# Patient Record
Sex: MALE | Race: WHITE | Employment: STUDENT | URBAN - METROPOLITAN AREA
[De-identification: names, ages, dates, MRNs, and addresses within clinical notes are randomized per-mention and may not be internally consistent; named-entity substitution may affect disease eponyms.]

---
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
## 2019-01-07 ENCOUNTER — HOSPITAL ENCOUNTER (OUTPATIENT)
Age: 19
Discharge: HOME | End: 2019-01-07
Attending: FAMILY MEDICINE
Payer: COMMERCIAL

## 2019-01-07 VITALS
TEMPERATURE: 98 F | HEART RATE: 81 BPM | RESPIRATION RATE: 16 BRPM | SYSTOLIC BLOOD PRESSURE: 119 MMHG | OXYGEN SATURATION: 98 % | DIASTOLIC BLOOD PRESSURE: 71 MMHG

## 2019-01-07 DIAGNOSIS — Z48.02 ENCOUNTER FOR REMOVAL OF SUTURES: Primary | ICD-10-CM

## 2019-01-07 NOTE — ED PROVIDER NOTES
48-year-old male presents today for suture removal on his right hand over the knuckles that was placed redness ago. He denies any pain, no problems with holding, gripping, tingling, numbness or weakness.   On examination of the laceration wound at the four

## 2019-01-07 NOTE — ED INITIAL ASSESSMENT (HPI)
Pt seen 12/28 for stitches to right hand after he punched a picture frame. Presents with stitches intact, no redness or evidence of infection.

## 2019-02-12 ENCOUNTER — OFFICE VISIT (OUTPATIENT)
Dept: FAMILY MEDICINE CLINIC | Facility: CLINIC | Age: 19
End: 2019-02-12
Payer: COMMERCIAL

## 2019-02-12 VITALS
RESPIRATION RATE: 20 BRPM | SYSTOLIC BLOOD PRESSURE: 120 MMHG | HEIGHT: 70 IN | HEART RATE: 76 BPM | DIASTOLIC BLOOD PRESSURE: 86 MMHG | BODY MASS INDEX: 18.13 KG/M2 | TEMPERATURE: 98 F | WEIGHT: 126.63 LBS

## 2019-02-12 DIAGNOSIS — R19.7 DIARRHEA, UNSPECIFIED TYPE: ICD-10-CM

## 2019-02-12 DIAGNOSIS — R17 ELEVATED BILIRUBIN: ICD-10-CM

## 2019-02-12 DIAGNOSIS — R10.84 GENERALIZED ABDOMINAL PAIN: ICD-10-CM

## 2019-02-12 DIAGNOSIS — R63.4 WEIGHT LOSS: Primary | ICD-10-CM

## 2019-02-12 LAB
ALBUMIN SERPL-MCNC: 4.8 G/DL (ref 3.4–5)
ALBUMIN/GLOB SERPL: 1.2 {RATIO} (ref 1–2)
ALP LIVER SERPL-CCNC: 94 U/L (ref 45–117)
ALT SERPL-CCNC: 11 U/L (ref 16–61)
ANION GAP SERPL CALC-SCNC: 9 MMOL/L (ref 0–18)
AST SERPL-CCNC: 16 U/L (ref 15–37)
BASOPHILS # BLD AUTO: 0.03 X10(3) UL (ref 0–0.2)
BASOPHILS NFR BLD AUTO: 0.5 %
BILIRUB SERPL-MCNC: 2.9 MG/DL (ref 0.1–2)
BUN BLD-MCNC: 9 MG/DL (ref 7–18)
BUN/CREAT SERPL: 9.3 (ref 10–20)
CALCIUM BLD-MCNC: 9.2 MG/DL (ref 8.5–10.1)
CHLORIDE SERPL-SCNC: 105 MMOL/L (ref 98–107)
CO2 SERPL-SCNC: 27 MMOL/L (ref 21–32)
CREAT BLD-MCNC: 0.97 MG/DL (ref 0.7–1.3)
DEPRECATED RDW RBC AUTO: 37.1 FL (ref 35.1–46.3)
EOSINOPHIL # BLD AUTO: 0.11 X10(3) UL (ref 0–0.7)
EOSINOPHIL NFR BLD AUTO: 1.8 %
ERYTHROCYTE [DISTWIDTH] IN BLOOD BY AUTOMATED COUNT: 12.7 % (ref 11–15)
GLOBULIN PLAS-MCNC: 3.9 G/DL (ref 2.8–4.4)
GLUCOSE BLD-MCNC: 108 MG/DL (ref 70–99)
HCT VFR BLD AUTO: 45.3 % (ref 39–53)
HGB BLD-MCNC: 15 G/DL (ref 13–17.5)
IMM GRANULOCYTES # BLD AUTO: 0.02 X10(3) UL (ref 0–1)
IMM GRANULOCYTES NFR BLD: 0.3 %
LYMPHOCYTES # BLD AUTO: 2.41 X10(3) UL (ref 1.5–5)
LYMPHOCYTES NFR BLD AUTO: 40.1 %
M PROTEIN MFR SERPL ELPH: 8.7 G/DL (ref 6.4–8.2)
MCH RBC QN AUTO: 26.9 PG (ref 26–34)
MCHC RBC AUTO-ENTMCNC: 33.1 G/DL (ref 31–37)
MCV RBC AUTO: 81.3 FL (ref 80–100)
MONOCYTES # BLD AUTO: 0.6 X10(3) UL (ref 0.1–1)
MONOCYTES NFR BLD AUTO: 10 %
NEUTROPHILS # BLD AUTO: 2.84 X10 (3) UL (ref 1.5–7.7)
NEUTROPHILS # BLD AUTO: 2.84 X10(3) UL (ref 1.5–7.7)
NEUTROPHILS NFR BLD AUTO: 47.3 %
OSMOLALITY SERPL CALC.SUM OF ELEC: 291 MOSM/KG (ref 275–295)
PLATELET # BLD AUTO: 226 10(3)UL (ref 150–450)
POTASSIUM SERPL-SCNC: 3.8 MMOL/L (ref 3.5–5.1)
RBC # BLD AUTO: 5.57 X10(6)UL (ref 4.3–5.7)
SODIUM SERPL-SCNC: 141 MMOL/L (ref 136–145)
T4 FREE SERPL-MCNC: 1.4 NG/DL (ref 0.9–1.6)
TSI SER-ACNC: 1.64 MIU/ML (ref 0.36–3.74)
WBC # BLD AUTO: 6 X10(3) UL (ref 4–11)

## 2019-02-12 PROCEDURE — 99214 OFFICE O/P EST MOD 30 MIN: CPT | Performed by: FAMILY MEDICINE

## 2019-02-12 PROCEDURE — 80050 GENERAL HEALTH PANEL: CPT | Performed by: FAMILY MEDICINE

## 2019-02-12 PROCEDURE — 82248 BILIRUBIN DIRECT: CPT | Performed by: FAMILY MEDICINE

## 2019-02-12 PROCEDURE — 36415 COLL VENOUS BLD VENIPUNCTURE: CPT | Performed by: FAMILY MEDICINE

## 2019-02-12 PROCEDURE — 84439 ASSAY OF FREE THYROXINE: CPT | Performed by: FAMILY MEDICINE

## 2019-02-12 NOTE — PROGRESS NOTES
Ayah Shelby is a 22year old male. HPI:   Barbra Foss is here for evaluation of stomach pain?  He states when he gets up he feels fine, but as the morning progresses he gets this sensation and then has to have a BM and may have 1-3 watery or loose stools, pain    Orders Placed This Encounter      CBC W Differential W Platelet [E]      Comp Metabolic Panel (14) [E]      TSH and Free T4 [E]      Meds & Refills for this Visit:  Requested Prescriptions      No prescriptions requested or ordered in this encounte

## 2019-02-12 NOTE — PROGRESS NOTES
Stomach issues only in am and will improve throughout the day. Not really in pain, just an odd sensation. Sometimes resolves after BM. Sometimes will have 3 BMs within an hour and a half. Trouble eating, sometimes will feel has to vomit.   Loss of appeti

## 2019-02-13 ENCOUNTER — TELEPHONE (OUTPATIENT)
Dept: FAMILY MEDICINE CLINIC | Facility: CLINIC | Age: 19
End: 2019-02-13

## 2019-02-13 DIAGNOSIS — R17 ELEVATED BILIRUBIN: Primary | ICD-10-CM

## 2019-02-13 LAB
BILIRUB DIRECT SERPL-MCNC: 0.2 MG/DL (ref 0–0.2)
BILIRUB SERPL-MCNC: 2.9 MG/DL (ref 0.1–2)

## 2019-02-13 NOTE — TELEPHONE ENCOUNTER
----- Message from Zofia Mitchell, DO sent at 2/13/2019 12:19 PM CST -----  Can we do total and direct bilirubin on this?

## 2019-02-15 ENCOUNTER — TELEPHONE (OUTPATIENT)
Dept: FAMILY MEDICINE CLINIC | Facility: CLINIC | Age: 19
End: 2019-02-15

## 2019-02-15 DIAGNOSIS — R11.0 NAUSEA: ICD-10-CM

## 2019-02-15 DIAGNOSIS — R10.11 ABDOMINAL PAIN, RIGHT UPPER QUADRANT: Primary | ICD-10-CM

## 2019-02-15 DIAGNOSIS — R63.4 WEIGHT LOSS: ICD-10-CM

## 2019-02-15 NOTE — TELEPHONE ENCOUNTER
----- Message from Heather Haney DO sent at 2/15/2019 11:38 AM CST -----  Can notify Elida Stover that his labs look pretty good overall, I didn't find anything specifically that would be causing his weight loss, but I would like to get an US of the abdomen to l

## 2019-02-15 NOTE — TELEPHONE ENCOUNTER
Verbal from Dr David Howard to call lab to see if celiac panel can be added. Spoke to Lali at the lab. Need gold tube. Unable to add lab. DS verbally notified.

## 2019-02-27 ENCOUNTER — OFFICE VISIT (OUTPATIENT)
Dept: FAMILY MEDICINE CLINIC | Facility: CLINIC | Age: 19
End: 2019-02-27
Payer: COMMERCIAL

## 2019-02-27 VITALS
WEIGHT: 126.81 LBS | DIASTOLIC BLOOD PRESSURE: 80 MMHG | TEMPERATURE: 99 F | RESPIRATION RATE: 18 BRPM | BODY MASS INDEX: 18 KG/M2 | HEART RATE: 106 BPM | SYSTOLIC BLOOD PRESSURE: 120 MMHG | OXYGEN SATURATION: 99 %

## 2019-02-27 DIAGNOSIS — R50.9 FEVER, UNSPECIFIED FEVER CAUSE: ICD-10-CM

## 2019-02-27 DIAGNOSIS — J10.1 INFLUENZA A: Primary | ICD-10-CM

## 2019-02-27 DIAGNOSIS — M79.10 MYALGIA: ICD-10-CM

## 2019-02-27 PROCEDURE — 99214 OFFICE O/P EST MOD 30 MIN: CPT | Performed by: FAMILY MEDICINE

## 2019-02-27 RX ORDER — OSELTAMIVIR PHOSPHATE 75 MG/1
75 CAPSULE ORAL 2 TIMES DAILY
Qty: 10 CAPSULE | Refills: 0 | Status: SHIPPED | OUTPATIENT
Start: 2019-02-27 | End: 2020-12-02 | Stop reason: ALTCHOICE

## 2019-02-27 NOTE — PROGRESS NOTES
HPI:   Toni San is a 22year old male who presents for upper respiratory symptoms for  2  days. Patient reports sore throat, congestion, fever with Tmax to 102, dry cough. Has had a dry cough and nausea no emesis, had some diarrhea.  Today, denies a & Refills for this Visit:  Requested Prescriptions     Signed Prescriptions Disp Refills   • Oseltamivir Phosphate 75 MG Oral Cap 10 capsule 0     Sig: Take 1 capsule (75 mg total) by mouth 2 (two) times daily.        Imaging & Consults:  None

## 2019-03-18 ENCOUNTER — PATIENT OUTREACH (OUTPATIENT)
Dept: FAMILY MEDICINE CLINIC | Facility: CLINIC | Age: 19
End: 2019-03-18

## 2020-10-14 ENCOUNTER — TELEPHONE (OUTPATIENT)
Dept: FAMILY MEDICINE CLINIC | Facility: CLINIC | Age: 20
End: 2020-10-14

## 2020-10-14 NOTE — TELEPHONE ENCOUNTER
Mom called, pt called her saying his heart felt funny and he wanted to see a dr.  Please call pt at [phone]

## 2020-12-02 NOTE — PROGRESS NOTES
Conception Stalker is a 23year old male. HPI:   Livia Knight was in the ER at [facility] for chest pain, 2 days ago, with chest pain, and heart racing, he does not drink ETOH he is not drinking enough water, he is not drinking any caffeine.  He notes that his Sx were clearly, has some insight into his issues, agreeable  ASSESSMENT AND PLAN:     Anxiety  (primary encounter diagnosis)  Palpitations  Dysthymia  Ptsd (post-traumatic stress disorder)  Suicidal ideation    HAS HX OF LSD USE IN THE PAST, ALSO HAS HX OF PTSD,

## 2021-10-15 ENCOUNTER — TELEPHONE (OUTPATIENT)
Dept: FAMILY MEDICINE CLINIC | Facility: CLINIC | Age: 21
End: 2021-10-15

## 2021-10-15 NOTE — TELEPHONE ENCOUNTER
Future Appointments   Date Time Provider Esa Divina   10/19/2021  3:00 PM [facility] NURSE YAMILKA [facility]     Pt coming on Tuesday for tetanus shot    Please place order, thank you.

## 2021-12-08 ENCOUNTER — TELEPHONE (OUTPATIENT)
Dept: FAMILY MEDICINE CLINIC | Facility: CLINIC | Age: 21
End: 2021-12-08

## 2021-12-08 NOTE — TELEPHONE ENCOUNTER
Mom and dad both tested COVID positive today with home tests    Pt refuses to take a test, but has all the sxs mom and dad do- cough, fever and congestion    Mom is wondering if we can place order for PAB incase they need it later this week?

## 2022-04-11 ENCOUNTER — TELEPHONE (OUTPATIENT)
Dept: FAMILY MEDICINE CLINIC | Facility: CLINIC | Age: 22
End: 2022-04-11

## 2022-04-11 NOTE — TELEPHONE ENCOUNTER
Unable to leave message because VM is full    If cut is bad enough - pt should have it looked at TDAP placed? Pt has order in chart from October 2021 for TDAP- looks like he was going to come in and have one done in October?

## 2022-04-11 NOTE — TELEPHONE ENCOUNTER
RN spoke to pt- he states he cut his hand on the tailgate of his fathers truck    He states the cut is not big enough to require stitches    He is current out of state in [place]    Dr. Bradford Hanna advised he can go to [facility] to have them do a tetanus shot    Pt verbalized understandng

## 2022-04-11 NOTE — TELEPHONE ENCOUNTER
patient cut his finger with some rust had a tdap in 2013 should he get another one now, he is out of town and will be back on wedneday can come in on Thursday

## 2022-10-27 ENCOUNTER — TELEPHONE (OUTPATIENT)
Dept: FAMILY MEDICINE CLINIC | Facility: CLINIC | Age: 22
End: 2022-10-27

## 2022-10-27 ENCOUNTER — NURSE ONLY (OUTPATIENT)
Dept: FAMILY MEDICINE CLINIC | Facility: CLINIC | Age: 22
End: 2022-10-27
Payer: MEDICAID

## 2022-10-27 PROCEDURE — 90471 IMMUNIZATION ADMIN: CPT | Performed by: FAMILY MEDICINE

## 2022-10-27 PROCEDURE — 90715 TDAP VACCINE 7 YRS/> IM: CPT | Performed by: FAMILY MEDICINE

## 2022-10-27 RX ORDER — CIPROFLOXACIN 500 MG/1
500 TABLET, FILM COATED ORAL 2 TIMES DAILY
Qty: 10 TABLET | Refills: 0 | Status: SHIPPED | OUTPATIENT
Start: 2022-10-27 | End: 2022-11-01

## 2022-10-27 NOTE — PROGRESS NOTES
Here for [place] tdap vaccine. Given in right deltoid. Patient tolerated well. Patient left office in stable condition.

## 2022-10-27 NOTE — TELEPHONE ENCOUNTER
He is also going to need an antibiotic, will send in for him CIpro 500 po bid for 7 days, make sure he soaks ti a couple of times a day for 3 -5 days. order for tdap placed

## 2022-10-27 NOTE — TELEPHONE ENCOUNTER
Pt states he stepped on a nail about an hour ago- he went through his shoe. He stuffed a paper towel in his shoe - doesn't know how deep it went. Pt states this is not work comp- but he is looking to have tetanus shot? Does he need a visit or nurse visit?

## 2023-08-17 ENCOUNTER — HOSPITAL ENCOUNTER (EMERGENCY)
Facility: HOSPITAL | Age: 23
Discharge: HOME | End: 2023-08-17
Attending: PEDIATRICS
Payer: MEDICAID

## 2023-08-17 ENCOUNTER — APPOINTMENT (OUTPATIENT)
Dept: GENERAL RADIOLOGY | Facility: HOSPITAL | Age: 23
End: 2023-08-17
Attending: PEDIATRICS
Payer: MEDICAID

## 2023-08-17 VITALS
WEIGHT: 150 LBS | BODY MASS INDEX: 21 KG/M2 | SYSTOLIC BLOOD PRESSURE: 126 MMHG | DIASTOLIC BLOOD PRESSURE: 71 MMHG | RESPIRATION RATE: 24 BRPM | HEIGHT: 71 IN | HEART RATE: 81 BPM | TEMPERATURE: 98 F | OXYGEN SATURATION: 96 %

## 2023-08-17 DIAGNOSIS — S68.119A FINGER AMPUTATION, NO COMPLICATION, INITIAL ENCOUNTER: Primary | ICD-10-CM

## 2023-08-17 PROCEDURE — 99284 EMERGENCY DEPT VISIT MOD MDM: CPT

## 2023-08-17 PROCEDURE — 99283 EMERGENCY DEPT VISIT LOW MDM: CPT

## 2023-08-17 PROCEDURE — 73140 X-RAY EXAM OF FINGER(S): CPT | Performed by: PEDIATRICS

## 2023-08-17 RX ORDER — CEPHALEXIN 500 MG/1
500 CAPSULE ORAL 2 TIMES DAILY
Qty: 10 CAPSULE | Refills: 0 | Status: SHIPPED | OUTPATIENT
Start: 2023-08-17 | End: 2023-08-22

## 2023-08-17 RX ORDER — HYDROCODONE BITARTRATE AND ACETAMINOPHEN 5; 325 MG/1; MG/1
2 TABLET ORAL ONCE
Status: COMPLETED | OUTPATIENT
Start: 2023-08-17 | End: 2023-08-17

## 2023-08-17 RX ORDER — HYDROCODONE BITARTRATE AND ACETAMINOPHEN 5; 325 MG/1; MG/1
TABLET ORAL
Status: COMPLETED
Start: 2023-08-17 | End: 2023-08-17

## 2023-08-17 RX ORDER — HYDROCODONE BITARTRATE AND ACETAMINOPHEN 5; 325 MG/1; MG/1
1 TABLET ORAL EVERY 4 HOURS PRN
Qty: 10 TABLET | Refills: 0 | Status: SHIPPED | OUTPATIENT
Start: 2023-08-17

## 2023-08-17 NOTE — DISCHARGE INSTRUCTIONS
Keep dressing on for 2 days. After that, clean with soap and water gently and apply antibiotic ointment.

## (undated) NOTE — MR AVS SNAPSHOT
[address] [facility] [id number] [phone]               Thank you for choosing us for your health care visit with Alvaro Bonner DO.   We are glad to serve you and happy to provide you with this sum Call [phone] for help. MyChart is NOT to be used for urgent needs. For medical emergencies, dial 911.             Educational Information     Healthy Active Living  An initiative of the American Academy of Pediatrics    Fact Sheet: Healthy Active Help your children form healthy habits. Healthy active children are more likely to be healthy active adults!              Visit [facility] online at  [email or web address]

## (undated) NOTE — LETTER
03/18/19        [address] [facility] [address]  [place] [address]      Dear Alida Pallas,    [address] [facility] records indicate that you have outstanding lab work and or testing that was ordered for you and has not yet been completed:  Lab Frequency Next Occurrence   US

## (undated) NOTE — MR AVS SNAPSHOT
[address] [facility] [id number] [phone]               Thank you for choosing us for your health care visit with More Ngo DO.   We are glad to serve you and happy to provide you with this sum Today's Orders     XR CHEST PA + LAT CHEST (CPT=71020)    Complete by:  Jan 30, 2017 (Approximate)    Assoc Dx:  Cough [R05], Fever, unspecified fever cause [R50.9]                 Scheduling Instructions     Monday January 30, 2017     Imaging:  XR CHEST